# Patient Record
Sex: MALE | Race: WHITE | NOT HISPANIC OR LATINO | URBAN - METROPOLITAN AREA
[De-identification: names, ages, dates, MRNs, and addresses within clinical notes are randomized per-mention and may not be internally consistent; named-entity substitution may affect disease eponyms.]

---

## 2023-08-01 ENCOUNTER — OFFICE VISIT (OUTPATIENT)
Dept: URBAN - METROPOLITAN AREA CLINIC 48 | Facility: CLINIC | Age: 68
End: 2023-08-01
Payer: MEDICARE

## 2023-08-01 ENCOUNTER — DASHBOARD ENCOUNTERS (OUTPATIENT)
Age: 68
End: 2023-08-01

## 2023-08-01 ENCOUNTER — LAB OUTSIDE AN ENCOUNTER (OUTPATIENT)
Dept: URBAN - METROPOLITAN AREA CLINIC 48 | Facility: CLINIC | Age: 68
End: 2023-08-01

## 2023-08-01 VITALS
DIASTOLIC BLOOD PRESSURE: 73 MMHG | HEIGHT: 68 IN | SYSTOLIC BLOOD PRESSURE: 113 MMHG | BODY MASS INDEX: 39.86 KG/M2 | OXYGEN SATURATION: 97 % | WEIGHT: 263 LBS | TEMPERATURE: 98.2 F | HEART RATE: 79 BPM

## 2023-08-01 DIAGNOSIS — R13.13 PHARYNGEAL DYSPHAGIA: ICD-10-CM

## 2023-08-01 DIAGNOSIS — K57.30 ACQUIRED DIVERTICULOSIS OF COLON: ICD-10-CM

## 2023-08-01 DIAGNOSIS — R19.4 CHANGE IN BOWEL HABIT: ICD-10-CM

## 2023-08-01 DIAGNOSIS — R10.84 ABDOMINAL CRAMPING, GENERALIZED: ICD-10-CM

## 2023-08-01 PROBLEM — 35298007: Status: ACTIVE | Noted: 2023-08-01

## 2023-08-01 PROBLEM — 88111009: Status: ACTIVE | Noted: 2023-08-01

## 2023-08-01 PROBLEM — 21101000119105: Status: ACTIVE | Noted: 2023-08-01

## 2023-08-01 PROBLEM — 285387005: Status: ACTIVE | Noted: 2023-08-01

## 2023-08-01 PROBLEM — 397881000: Status: ACTIVE | Noted: 2023-08-01

## 2023-08-01 PROBLEM — 428283002: Status: ACTIVE | Noted: 2023-08-01

## 2023-08-01 PROBLEM — 429000004: Status: ACTIVE | Noted: 2023-08-01

## 2023-08-01 PROCEDURE — 99204 OFFICE O/P NEW MOD 45 MIN: CPT | Performed by: NURSE PRACTITIONER

## 2023-08-01 RX ORDER — HYDROCHLOROTHIAZIDE 25 MG/1
TAKE ONE-HALF TABLET BY MOUTH ONE TIME DAILY TABLET ORAL
Qty: 45 UNSPECIFIED | Refills: 2 | Status: ACTIVE | COMMUNITY

## 2023-08-01 RX ORDER — OMEPRAZOLE 40 MG/1
TAKE ONE CAPSULE BY MOUTH EVERY MORNING CAPSULE, DELAYED RELEASE ORAL
Qty: 90 UNSPECIFIED | Refills: 1 | Status: ACTIVE | COMMUNITY

## 2023-08-01 RX ORDER — METHYLPREDNISOLONE 4 MG/1
TABLET ORAL
Qty: 21 TABLET | Status: ACTIVE | COMMUNITY

## 2023-08-01 RX ORDER — FLUTICASONE PROPIONATE 50 UG/1
SPRAY, METERED NASAL
Qty: 16 GRAM | Status: ACTIVE | COMMUNITY

## 2023-08-01 RX ORDER — ROSUVASTATIN CALCIUM 40 MG/1
TABLET, FILM COATED ORAL
Qty: 90 TABLET | Status: ACTIVE | COMMUNITY

## 2023-08-01 RX ORDER — LACTULOSE 10 G/15ML
15 ML AS NEEDED SOLUTION ORAL ONCE A DAY
Qty: 450 | OUTPATIENT
Start: 2023-08-01 | End: 2023-08-31

## 2023-08-01 RX ORDER — EMPAGLIFLOZIN 25 MG/1
TAKE ONE TABLET BY MOUTH EVERY MORNING TABLET, FILM COATED ORAL
Qty: 90 UNSPECIFIED | Refills: 1 | Status: ACTIVE | COMMUNITY

## 2023-08-01 NOTE — HPI-TODAY'S VISIT:
68 year old male presents for evaluation of bowel habits. He has left mid abdominal pain intermittently for 15 years. Last colon was in 2021 and he had polyps removed and diverticulosis. A 3 year recall was recommended. Bowel movements are every 3-4 days for which Miralax did not help. Exlax does work. He tried Linzess and had abdominal cramping and he stopped it. Currently, he is on daily Benefiber. Stool is hard but denies blood unless he has  a hemorrhoidal flare. Left mid abdominal pain has been intermittent for years. Having a bowel movement does not relieve pain.   GERD is well controllled on Omeprazole 40mg and he has had reflux for 40 years. He has mild dysphagia at times to solids. Last EGD was in 2021 and was normal s/p 60 Fr dilatation.   His mother had GERD and gallbladder disease. His sister has pancreatic cancer.

## 2023-08-01 NOTE — PHYSICAL EXAM HENT:
Head, normocephalic, atraumatic, Face, Face within normal limits, Ears, External ears within normal limits, Nose/Nasopharynx, External nose normal appearance, nares patent, no nasal discharge, Mouth and Throat, Oral cavity appearance normal, Lips, Appearance normal 2 seconds or less

## 2023-08-10 ENCOUNTER — LAB OUTSIDE AN ENCOUNTER (OUTPATIENT)
Dept: URBAN - METROPOLITAN AREA CLINIC 44 | Facility: CLINIC | Age: 68
End: 2023-08-10

## 2023-08-10 ENCOUNTER — CLAIMS CREATED FROM THE CLAIM WINDOW (OUTPATIENT)
Dept: URBAN - METROPOLITAN AREA CLINIC 4 | Facility: CLINIC | Age: 68
End: 2023-08-10
Payer: MEDICARE

## 2023-08-10 ENCOUNTER — TELEPHONE ENCOUNTER (OUTPATIENT)
Dept: URBAN - METROPOLITAN AREA CLINIC 44 | Facility: CLINIC | Age: 68
End: 2023-08-10

## 2023-08-10 ENCOUNTER — OFFICE VISIT (OUTPATIENT)
Dept: URBAN - METROPOLITAN AREA SURGERY CENTER 27 | Facility: SURGERY CENTER | Age: 68
End: 2023-08-10
Payer: MEDICARE

## 2023-08-10 DIAGNOSIS — R13.10 ESOPHAGEAL DYSPHAGIA: ICD-10-CM

## 2023-08-10 DIAGNOSIS — K29.60 OTHER GASTRITIS WITHOUT BLEEDING: ICD-10-CM

## 2023-08-10 DIAGNOSIS — R13.14 CRICOPHARYNGEAL DISORDER: ICD-10-CM

## 2023-08-10 DIAGNOSIS — K22.89 OTHER SPECIFIED DISEASE OF ESOPHAGUS: ICD-10-CM

## 2023-08-10 DIAGNOSIS — K29.70 GASTRITIS, UNSPECIFIED, WITHOUT BLEEDING: ICD-10-CM

## 2023-08-10 DIAGNOSIS — K29.70 CHRONIC ACITVE GASTRITIS (H.PYLORI NEGATIVE): ICD-10-CM

## 2023-08-10 DIAGNOSIS — K22.2 ACQUIRED ESOPHAGEAL RING: ICD-10-CM

## 2023-08-10 PROCEDURE — 88305 TISSUE EXAM BY PATHOLOGIST: CPT | Performed by: PATHOLOGY

## 2023-08-10 PROCEDURE — 43249 ESOPH EGD DILATION <30 MM: CPT | Performed by: INTERNAL MEDICINE

## 2023-08-10 PROCEDURE — 43239 EGD BIOPSY SINGLE/MULTIPLE: CPT | Performed by: INTERNAL MEDICINE

## 2023-08-10 PROCEDURE — G8907 PT DOC NO EVENTS ON DISCHARG: HCPCS | Performed by: INTERNAL MEDICINE

## 2023-08-10 PROCEDURE — 00731 ANES UPR GI NDSC PX NOS: CPT | Performed by: NURSE ANESTHETIST, CERTIFIED REGISTERED

## 2023-08-10 PROCEDURE — 88312 SPECIAL STAINS GROUP 1: CPT | Performed by: PATHOLOGY

## 2023-08-10 RX ORDER — ROSUVASTATIN CALCIUM 40 MG/1
TABLET, FILM COATED ORAL
Qty: 90 TABLET | Status: ACTIVE | COMMUNITY

## 2023-08-10 RX ORDER — EMPAGLIFLOZIN 25 MG/1
TAKE ONE TABLET BY MOUTH EVERY MORNING TABLET, FILM COATED ORAL
Qty: 90 UNSPECIFIED | Refills: 1 | Status: ACTIVE | COMMUNITY

## 2023-08-10 RX ORDER — LACTULOSE 10 G/15ML
15 ML AS NEEDED SOLUTION ORAL ONCE A DAY
Qty: 450 | Status: ACTIVE | COMMUNITY
Start: 2023-08-01 | End: 2023-08-31

## 2023-08-10 RX ORDER — OMEPRAZOLE 40 MG/1
TAKE ONE CAPSULE BY MOUTH EVERY MORNING CAPSULE, DELAYED RELEASE ORAL
Qty: 90 UNSPECIFIED | Refills: 1 | Status: ACTIVE | COMMUNITY

## 2023-08-10 RX ORDER — METHYLPREDNISOLONE 4 MG/1
TABLET ORAL
Qty: 21 TABLET | Status: ACTIVE | COMMUNITY

## 2023-08-10 RX ORDER — FLUTICASONE PROPIONATE 50 UG/1
SPRAY, METERED NASAL
Qty: 16 GRAM | Status: ACTIVE | COMMUNITY

## 2023-08-10 RX ORDER — HYDROCHLOROTHIAZIDE 25 MG/1
TAKE ONE-HALF TABLET BY MOUTH ONE TIME DAILY TABLET ORAL
Qty: 45 UNSPECIFIED | Refills: 2 | Status: ACTIVE | COMMUNITY